# Patient Record
Sex: FEMALE | Race: WHITE
[De-identification: names, ages, dates, MRNs, and addresses within clinical notes are randomized per-mention and may not be internally consistent; named-entity substitution may affect disease eponyms.]

---

## 2020-01-23 ENCOUNTER — HOSPITAL ENCOUNTER (EMERGENCY)
Dept: HOSPITAL 94 - ER | Age: 30
Discharge: HOME | End: 2020-01-23
Payer: MEDICAID

## 2020-01-23 VITALS — WEIGHT: 95.2 LBS | BODY MASS INDEX: 16.25 KG/M2 | HEIGHT: 64 IN

## 2020-01-23 VITALS — SYSTOLIC BLOOD PRESSURE: 113 MMHG | DIASTOLIC BLOOD PRESSURE: 95 MMHG

## 2020-01-23 DIAGNOSIS — B97.89: ICD-10-CM

## 2020-01-23 DIAGNOSIS — J06.9: Primary | ICD-10-CM

## 2020-01-23 LAB — HCG UR QL: NEGATIVE

## 2020-01-23 PROCEDURE — 99283 EMERGENCY DEPT VISIT LOW MDM: CPT

## 2020-01-23 PROCEDURE — 81025 URINE PREGNANCY TEST: CPT

## 2020-02-02 ENCOUNTER — HOSPITAL ENCOUNTER (EMERGENCY)
Dept: HOSPITAL 94 - ER | Age: 30
Discharge: HOME | End: 2020-02-02
Payer: COMMERCIAL

## 2020-02-02 VITALS — DIASTOLIC BLOOD PRESSURE: 68 MMHG | SYSTOLIC BLOOD PRESSURE: 112 MMHG

## 2020-02-02 VITALS — HEIGHT: 64 IN | BODY MASS INDEX: 17.96 KG/M2 | WEIGHT: 105.23 LBS

## 2020-02-02 DIAGNOSIS — N39.0: Primary | ICD-10-CM

## 2020-02-02 DIAGNOSIS — Z59.0: ICD-10-CM
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
## 2020-02-02 DIAGNOSIS — H92.03: ICD-10-CM

## 2020-02-02 DIAGNOSIS — Z11.3: ICD-10-CM

## 2020-02-02 DIAGNOSIS — F15.10: ICD-10-CM

## 2020-02-02 DIAGNOSIS — F10.99: ICD-10-CM

## 2020-02-02 DIAGNOSIS — Z79.899: ICD-10-CM

## 2020-02-02 DIAGNOSIS — N76.5: ICD-10-CM

## 2020-02-02 DIAGNOSIS — F17.200: ICD-10-CM

## 2020-02-02 LAB
BACTERIA URNS QL MICRO: (no result) /HPF
CLARITY UR: (no result)
COLOR UR: YELLOW
DEPRECATED SQUAMOUS URNS QL MICRO: (no result) /LPF
GLUCOSE UR STRIP-MCNC: NEGATIVE MG/DL
HCG UR QL: NEGATIVE
HGB UR QL STRIP: (no result)
KETONES UR STRIP-MCNC: (no result) MG/DL
LEUKOCYTE ESTERASE UR QL STRIP: (no result)
MUCOUS THREADS URNS QL MICRO: (no result) /LPF
NITRITE UR QL STRIP: NEGATIVE
PH UR STRIP: 6.5 [PH] (ref 4.8–8)
PROT UR QL STRIP: (no result) MG/DL
RBC #/AREA URNS HPF: (no result) /HPF (ref 0–2)
SP GR UR STRIP: 1.02 (ref 1–1.03)
URN COLLECT METHOD CLASS: (no result)
UROBILINOGEN UR STRIP-MCNC: 1 E.U/DL (ref 0.2–1)
WBC #/AREA URNS HPF: (no result) /HPF (ref 0–4)

## 2020-02-02 PROCEDURE — 87491 CHLMYD TRACH DNA AMP PROBE: CPT

## 2020-02-02 PROCEDURE — 96372 THER/PROPH/DIAG INJ SC/IM: CPT

## 2020-02-02 PROCEDURE — 86696 HERPES SIMPLEX TYPE 2 TEST: CPT

## 2020-02-02 PROCEDURE — 81025 URINE PREGNANCY TEST: CPT

## 2020-02-02 PROCEDURE — 81001 URINALYSIS AUTO W/SCOPE: CPT

## 2020-02-02 PROCEDURE — 87591 N.GONORRHOEAE DNA AMP PROB: CPT

## 2020-02-02 PROCEDURE — 36415 COLL VENOUS BLD VENIPUNCTURE: CPT

## 2020-02-02 PROCEDURE — 87088 URINE BACTERIA CULTURE: CPT

## 2020-02-02 PROCEDURE — 99284 EMERGENCY DEPT VISIT MOD MDM: CPT

## 2020-02-02 SDOH — ECONOMIC STABILITY - HOUSING INSECURITY: HOMELESSNESS: Z59.0

## 2020-02-02 NOTE — NUR
Phoned lab to inquire about the results for lab work, some are pending and some 
are send out tests.